# Patient Record
Sex: FEMALE | Race: WHITE | Employment: UNEMPLOYED | URBAN - METROPOLITAN AREA
[De-identification: names, ages, dates, MRNs, and addresses within clinical notes are randomized per-mention and may not be internally consistent; named-entity substitution may affect disease eponyms.]

---

## 2017-05-24 PROCEDURE — 88305 TISSUE EXAM BY PATHOLOGIST: CPT | Performed by: RADIOLOGY

## 2017-10-18 PROCEDURE — 82746 ASSAY OF FOLIC ACID SERUM: CPT | Performed by: INTERNAL MEDICINE

## 2017-10-18 PROCEDURE — 86803 HEPATITIS C AB TEST: CPT | Performed by: INTERNAL MEDICINE

## 2017-10-18 PROCEDURE — 82607 VITAMIN B-12: CPT | Performed by: INTERNAL MEDICINE

## 2018-05-04 PROBLEM — E03.8 HYPOTHYROIDISM DUE TO HASHIMOTO'S THYROIDITIS: Status: ACTIVE | Noted: 2018-05-04

## 2018-05-04 PROBLEM — E06.3 HYPOTHYROIDISM DUE TO HASHIMOTO'S THYROIDITIS: Status: ACTIVE | Noted: 2018-05-04

## 2018-05-04 PROBLEM — Z85.828 HISTORY OF SKIN CANCER: Status: ACTIVE | Noted: 2018-05-04

## 2018-10-24 PROBLEM — E03.4 HYPOTHYROIDISM DUE TO ACQUIRED ATROPHY OF THYROID: Status: ACTIVE | Noted: 2018-10-24

## 2018-10-24 PROBLEM — E03.8 HYPOTHYROIDISM DUE TO HASHIMOTO'S THYROIDITIS: Status: RESOLVED | Noted: 2018-05-04 | Resolved: 2018-10-24

## 2018-10-24 PROBLEM — E06.3 HYPOTHYROIDISM DUE TO HASHIMOTO'S THYROIDITIS: Status: RESOLVED | Noted: 2018-05-04 | Resolved: 2018-10-24

## 2020-10-27 PROBLEM — E53.8 VITAMIN B12 DEFICIENCY: Status: ACTIVE | Noted: 2020-10-27

## 2021-04-12 DIAGNOSIS — Z23 NEED FOR VACCINATION: ICD-10-CM

## 2021-06-02 PROCEDURE — 88305 TISSUE EXAM BY PATHOLOGIST: CPT | Performed by: RADIOLOGY

## 2021-06-02 PROCEDURE — 88344 IMHCHEM/IMCYTCHM EA MLT ANTB: CPT | Performed by: RADIOLOGY

## 2021-10-28 PROBLEM — E06.3 HASHIMOTO'S THYROIDITIS: Status: ACTIVE | Noted: 2021-10-28

## 2021-10-28 PROBLEM — R92.8 ABNORMAL MAMMOGRAM OF RIGHT BREAST: Status: ACTIVE | Noted: 2021-06-01

## 2023-04-18 ENCOUNTER — ORDER TRANSCRIPTION (OUTPATIENT)
Dept: ADMINISTRATIVE | Facility: HOSPITAL | Age: 68
End: 2023-04-18

## 2023-04-18 DIAGNOSIS — Z13.6 SCREENING FOR CARDIOVASCULAR CONDITION: Primary | ICD-10-CM

## 2023-07-09 ENCOUNTER — HOSPITAL ENCOUNTER (OUTPATIENT)
Dept: CT IMAGING | Age: 68
Discharge: HOME | End: 2023-07-09
Attending: FAMILY MEDICINE

## 2023-07-09 DIAGNOSIS — Z13.6 SCREENING FOR CARDIOVASCULAR CONDITION: ICD-10-CM

## 2023-07-26 ENCOUNTER — OFFICE VISIT (OUTPATIENT)
Dept: RHEUMATOLOGY | Facility: CLINIC | Age: 68
End: 2023-07-26
Payer: MEDICARE

## 2023-07-26 VITALS
TEMPERATURE: 98 F | WEIGHT: 127 LBS | BODY MASS INDEX: 19.25 KG/M2 | SYSTOLIC BLOOD PRESSURE: 110 MMHG | DIASTOLIC BLOOD PRESSURE: 70 MMHG | OXYGEN SATURATION: 98 % | HEART RATE: 75 BPM | HEIGHT: 68 IN | RESPIRATION RATE: 16 BRPM

## 2023-07-26 DIAGNOSIS — G89.29 CHRONIC BILATERAL LOW BACK PAIN WITHOUT SCIATICA: ICD-10-CM

## 2023-07-26 DIAGNOSIS — G62.9 NEUROPATHY: ICD-10-CM

## 2023-07-26 DIAGNOSIS — M81.0 AGE-RELATED OSTEOPOROSIS WITHOUT CURRENT PATHOLOGICAL FRACTURE: Primary | ICD-10-CM

## 2023-07-26 DIAGNOSIS — R61 NIGHT SWEATS: ICD-10-CM

## 2023-07-26 DIAGNOSIS — E53.8 VITAMIN B12 DEFICIENCY: ICD-10-CM

## 2023-07-26 DIAGNOSIS — M54.50 CHRONIC BILATERAL LOW BACK PAIN WITHOUT SCIATICA: ICD-10-CM

## 2023-07-26 PROCEDURE — 99204 OFFICE O/P NEW MOD 45 MIN: CPT | Performed by: INTERNAL MEDICINE

## 2023-07-26 RX ORDER — ASCORBATE CALCIUM/BIOFLAVONOID 1000-200MG
TABLET, EXTENDED RELEASE ORAL
COMMUNITY
Start: 2022-07-13

## 2023-07-31 ENCOUNTER — LAB ENCOUNTER (OUTPATIENT)
Dept: LAB | Age: 68
End: 2023-07-31
Attending: INTERNAL MEDICINE
Payer: MEDICARE

## 2023-07-31 ENCOUNTER — HOSPITAL ENCOUNTER (OUTPATIENT)
Dept: GENERAL RADIOLOGY | Age: 68
Discharge: HOME | End: 2023-07-31
Attending: INTERNAL MEDICINE
Payer: MEDICARE

## 2023-07-31 DIAGNOSIS — G62.9 NEUROPATHY: ICD-10-CM

## 2023-07-31 DIAGNOSIS — G89.29 CHRONIC BILATERAL LOW BACK PAIN WITHOUT SCIATICA: ICD-10-CM

## 2023-07-31 DIAGNOSIS — M54.50 CHRONIC BILATERAL LOW BACK PAIN WITHOUT SCIATICA: ICD-10-CM

## 2023-07-31 DIAGNOSIS — I34.1 MITRAL VALVE PROLAPSE: ICD-10-CM

## 2023-07-31 DIAGNOSIS — E53.8 VITAMIN B12 DEFICIENCY: ICD-10-CM

## 2023-07-31 DIAGNOSIS — M81.0 AGE-RELATED OSTEOPOROSIS WITHOUT CURRENT PATHOLOGICAL FRACTURE: ICD-10-CM

## 2023-07-31 DIAGNOSIS — R94.31 ECG ABNORMAL: Primary | ICD-10-CM

## 2023-07-31 DIAGNOSIS — R61 NIGHT SWEATS: ICD-10-CM

## 2023-07-31 DIAGNOSIS — R06.09 DOE (DYSPNEA ON EXERTION): ICD-10-CM

## 2023-07-31 DIAGNOSIS — R60.0 LOCALIZED EDEMA: ICD-10-CM

## 2023-07-31 DIAGNOSIS — I87.2 VENOUS INSUFFICIENCY OF LOWER EXTREMITY: ICD-10-CM

## 2023-07-31 DIAGNOSIS — E06.3 HASHIMOTO'S DISEASE: ICD-10-CM

## 2023-07-31 LAB
ALBUMIN SERPL-MCNC: 3.9 G/DL (ref 3.4–5)
ALBUMIN/GLOB SERPL: 1.6 {RATIO} (ref 1–2)
ALP LIVER SERPL-CCNC: 65 U/L
ALT SERPL-CCNC: 25 U/L
ANION GAP SERPL CALC-SCNC: 2 MMOL/L (ref 0–18)
AST SERPL-CCNC: 30 U/L (ref 15–37)
BASOPHILS # BLD AUTO: 0.04 X10(3) UL (ref 0–0.2)
BASOPHILS NFR BLD AUTO: 1 %
BILIRUB SERPL-MCNC: 0.6 MG/DL (ref 0.1–2)
BUN BLD-MCNC: 9 MG/DL (ref 7–18)
CALCIUM BLD-MCNC: 9.1 MG/DL (ref 8.5–10.1)
CHLORIDE SERPL-SCNC: 103 MMOL/L (ref 98–112)
CHOLEST SERPL-MCNC: 172 MG/DL (ref ?–200)
CO2 SERPL-SCNC: 29 MMOL/L (ref 21–32)
CREAT BLD-MCNC: 0.78 MG/DL
CRP SERPL HS-MCNC: <0.16 MG/L (ref ?–3)
EGFRCR SERPLBLD CKD-EPI 2021: 83 ML/MIN/1.73M2 (ref 60–?)
EOSINOPHIL # BLD AUTO: 0.12 X10(3) UL (ref 0–0.7)
EOSINOPHIL NFR BLD AUTO: 2.9 %
ERYTHROCYTE [DISTWIDTH] IN BLOOD BY AUTOMATED COUNT: 11.8 %
FASTING PATIENT LIPID ANSWER: YES
FASTING STATUS PATIENT QL REPORTED: YES
GLOBULIN PLAS-MCNC: 2.5 G/DL (ref 2.8–4.4)
GLUCOSE BLD-MCNC: 97 MG/DL (ref 70–99)
HCT VFR BLD AUTO: 32.6 %
HDLC SERPL-MCNC: 97 MG/DL (ref 40–59)
HGB BLD-MCNC: 11.9 G/DL
IMM GRANULOCYTES # BLD AUTO: 0.01 X10(3) UL (ref 0–1)
IMM GRANULOCYTES NFR BLD: 0.2 %
LDLC SERPL CALC-MCNC: 66 MG/DL (ref ?–100)
LYMPHOCYTES # BLD AUTO: 1.07 X10(3) UL (ref 1–4)
LYMPHOCYTES NFR BLD AUTO: 25.6 %
MAGNESIUM SERPL-MCNC: 2.1 MG/DL (ref 1.6–2.6)
MCH RBC QN AUTO: 32.1 PG (ref 26–34)
MCHC RBC AUTO-ENTMCNC: 36.5 G/DL (ref 31–37)
MCV RBC AUTO: 87.9 FL
MONOCYTES # BLD AUTO: 0.41 X10(3) UL (ref 0.1–1)
MONOCYTES NFR BLD AUTO: 9.8 %
NEUTROPHILS # BLD AUTO: 2.53 X10 (3) UL (ref 1.5–7.7)
NEUTROPHILS # BLD AUTO: 2.53 X10(3) UL (ref 1.5–7.7)
NEUTROPHILS NFR BLD AUTO: 60.5 %
NONHDLC SERPL-MCNC: 75 MG/DL (ref ?–130)
OSMOLALITY SERPL CALC.SUM OF ELEC: 277 MOSM/KG (ref 275–295)
PHOSPHATE SERPL-MCNC: 3.6 MG/DL (ref 2.5–4.9)
PLATELET # BLD AUTO: 185 10(3)UL (ref 150–450)
POTASSIUM SERPL-SCNC: 3.8 MMOL/L (ref 3.5–5.1)
PROT SERPL-MCNC: 6.4 G/DL (ref 6.4–8.2)
PTH-INTACT SERPL-MCNC: 31.5 PG/ML (ref 18.5–88)
RBC # BLD AUTO: 3.71 X10(6)UL
SODIUM SERPL-SCNC: 134 MMOL/L (ref 136–145)
TRIGL SERPL-MCNC: 41 MG/DL (ref 30–149)
TSI SER-ACNC: 0.43 MIU/ML (ref 0.36–3.74)
VIT B12 SERPL-MCNC: 782 PG/ML (ref 193–986)
VIT D+METAB SERPL-MCNC: 50.4 NG/ML (ref 30–100)
VLDLC SERPL CALC-MCNC: 6 MG/DL (ref 0–30)
WBC # BLD AUTO: 4.2 X10(3) UL (ref 4–11)

## 2023-07-31 PROCEDURE — 86141 C-REACTIVE PROTEIN HS: CPT

## 2023-07-31 PROCEDURE — 72202 X-RAY EXAM SI JOINTS 3/> VWS: CPT | Performed by: INTERNAL MEDICINE

## 2023-07-31 PROCEDURE — 82607 VITAMIN B-12: CPT

## 2023-07-31 PROCEDURE — 83521 IG LIGHT CHAINS FREE EACH: CPT

## 2023-07-31 PROCEDURE — 86334 IMMUNOFIX E-PHORESIS SERUM: CPT

## 2023-07-31 PROCEDURE — 83735 ASSAY OF MAGNESIUM: CPT

## 2023-07-31 PROCEDURE — 85025 COMPLETE CBC W/AUTO DIFF WBC: CPT

## 2023-07-31 PROCEDURE — 36415 COLL VENOUS BLD VENIPUNCTURE: CPT

## 2023-07-31 PROCEDURE — 80061 LIPID PANEL: CPT

## 2023-07-31 PROCEDURE — 84165 PROTEIN E-PHORESIS SERUM: CPT

## 2023-07-31 PROCEDURE — 80053 COMPREHEN METABOLIC PANEL: CPT

## 2023-07-31 PROCEDURE — 82306 VITAMIN D 25 HYDROXY: CPT

## 2023-07-31 PROCEDURE — 84443 ASSAY THYROID STIM HORMONE: CPT

## 2023-07-31 PROCEDURE — 72110 X-RAY EXAM L-2 SPINE 4/>VWS: CPT | Performed by: INTERNAL MEDICINE

## 2023-07-31 PROCEDURE — 83970 ASSAY OF PARATHORMONE: CPT

## 2023-07-31 PROCEDURE — 84100 ASSAY OF PHOSPHORUS: CPT

## 2023-08-02 ENCOUNTER — PATIENT MESSAGE (OUTPATIENT)
Dept: RHEUMATOLOGY | Facility: CLINIC | Age: 68
End: 2023-08-02

## 2023-08-02 LAB
ALBUMIN SERPL ELPH-MCNC: 4.36 G/DL (ref 3.75–5.21)
ALBUMIN/GLOB SERPL: 2.37 {RATIO} (ref 1–2)
ALPHA1 GLOB SERPL ELPH-MCNC: 0.24 G/DL (ref 0.19–0.46)
ALPHA2 GLOB SERPL ELPH-MCNC: 0.53 G/DL (ref 0.48–1.05)
B-GLOBULIN SERPL ELPH-MCNC: 0.56 G/DL (ref 0.68–1.23)
GAMMA GLOB SERPL ELPH-MCNC: 0.51 G/DL (ref 0.62–1.7)
KAPPA LC FREE SER-MCNC: 0.79 MG/DL (ref 0.33–1.94)
KAPPA LC FREE/LAMBDA FREE SER NEPH: 1.02 {RATIO} (ref 0.26–1.65)
LAMBDA LC FREE SERPL-MCNC: 0.78 MG/DL (ref 0.57–2.63)
PROT SERPL-MCNC: 6.2 G/DL (ref 6.4–8.2)

## 2023-08-04 NOTE — TELEPHONE ENCOUNTER
Called patient. Discussed test results in detail. Labs grossly negative for secondary causes of osteoporosis. I still recommend moving forward with Prolia. She is interested in moving forward but would like to hold off for now due to wanting to get worked up for other things. Discussed that her SPEP showed hypogammaglobulinemia. Recommended that she consider immunology versus hematology evaluation. She would like to get this sorted before starting/committing to Prolia. Patient understands her risk of fracture in the meantime. Will keep us updated when she would like us to move forward with getting Prolia approved. Send [employer] message with names of a few providers for her to consider    Patient verbalized understanding of above instructions. No further questions at this time.     Shiela Ochoa, DO  [facility]  8/4/2023 07/2023  CBC with WBC 4.2; hemoglobin 11.9; platelet 972  CMP grossly normal  SPEP with hypogammaglobulinemia; no monoclonal protein detected by immunofixation  Magnesium normal  PTH normal  B12 782 normal  TSH normal  High-sensitivity CRP normal  Vitamin D 50.4

## 2023-08-31 ENCOUNTER — OFFICE VISIT (OUTPATIENT)
Dept: HEMATOLOGY/ONCOLOGY | Facility: HOSPITAL | Age: 68
End: 2023-08-31
Attending: INTERNAL MEDICINE
Payer: MEDICARE

## 2023-08-31 VITALS
OXYGEN SATURATION: 100 % | TEMPERATURE: 98 F | SYSTOLIC BLOOD PRESSURE: 158 MMHG | BODY MASS INDEX: 20 KG/M2 | RESPIRATION RATE: 18 BRPM | HEART RATE: 68 BPM | WEIGHT: 129.38 LBS | DIASTOLIC BLOOD PRESSURE: 73 MMHG

## 2023-08-31 DIAGNOSIS — D80.1 HYPOGAMMAGLOBULINEMIA (HCC): ICD-10-CM

## 2023-08-31 DIAGNOSIS — D64.9 ANEMIA, UNSPECIFIED TYPE: Primary | ICD-10-CM

## 2023-08-31 DIAGNOSIS — M81.0 AGE-RELATED OSTEOPOROSIS WITHOUT CURRENT PATHOLOGICAL FRACTURE: ICD-10-CM

## 2023-08-31 DIAGNOSIS — D50.8 IRON DEFICIENCY ANEMIA SECONDARY TO INADEQUATE DIETARY IRON INTAKE: ICD-10-CM

## 2023-08-31 LAB
DEPRECATED HBV CORE AB SER IA-ACNC: 25.6 NG/ML
FOLATE SERPL-MCNC: 21.1 NG/ML (ref 8.7–?)
IGA SERPL-MCNC: 69.3 MG/DL (ref 70–312)
IGM SERPL-MCNC: 78.9 MG/DL (ref 43–279)
IMMUNOGLOBULIN PNL SER-MCNC: 521 MG/DL (ref 791–1643)
IRON SATN MFR SERPL: 27 %
IRON SERPL-MCNC: 109 UG/DL
TIBC SERPL-MCNC: 398 UG/DL (ref 240–450)
TRANSFERRIN SERPL-MCNC: 267 MG/DL (ref 200–360)

## 2023-08-31 PROCEDURE — 99205 OFFICE O/P NEW HI 60 MIN: CPT | Performed by: INTERNAL MEDICINE

## 2023-09-01 ENCOUNTER — TELEPHONE (OUTPATIENT)
Dept: HEMATOLOGY/ONCOLOGY | Facility: HOSPITAL | Age: 68
End: 2023-09-01

## 2023-09-15 ENCOUNTER — OFFICE VISIT (OUTPATIENT)
Dept: HEMATOLOGY/ONCOLOGY | Facility: HOSPITAL | Age: 68
End: 2023-09-15
Attending: INTERNAL MEDICINE
Payer: MEDICARE

## 2023-09-15 VITALS
SYSTOLIC BLOOD PRESSURE: 122 MMHG | HEART RATE: 57 BPM | DIASTOLIC BLOOD PRESSURE: 68 MMHG | OXYGEN SATURATION: 98 % | TEMPERATURE: 98 F | RESPIRATION RATE: 16 BRPM

## 2023-09-15 DIAGNOSIS — D50.8 IRON DEFICIENCY ANEMIA SECONDARY TO INADEQUATE DIETARY IRON INTAKE: Primary | ICD-10-CM

## 2023-09-15 PROCEDURE — 96374 THER/PROPH/DIAG INJ IV PUSH: CPT

## 2023-11-07 ENCOUNTER — OFFICE VISIT (OUTPATIENT)
Dept: HEMATOLOGY/ONCOLOGY | Facility: HOSPITAL | Age: 68
End: 2023-11-07
Attending: INTERNAL MEDICINE
Payer: MEDICARE

## 2023-11-07 VITALS
OXYGEN SATURATION: 99 % | BODY MASS INDEX: 19.55 KG/M2 | HEIGHT: 67.99 IN | WEIGHT: 129 LBS | DIASTOLIC BLOOD PRESSURE: 71 MMHG | RESPIRATION RATE: 16 BRPM | TEMPERATURE: 98 F | HEART RATE: 58 BPM | SYSTOLIC BLOOD PRESSURE: 121 MMHG

## 2023-11-07 DIAGNOSIS — E03.4 HYPOTHYROIDISM DUE TO ACQUIRED ATROPHY OF THYROID: ICD-10-CM

## 2023-11-07 DIAGNOSIS — D80.1 HYPOGAMMAGLOBULINEMIA (HCC): Primary | ICD-10-CM

## 2023-11-07 DIAGNOSIS — G62.9 NEUROPATHY: ICD-10-CM

## 2023-11-07 DIAGNOSIS — D50.8 IRON DEFICIENCY ANEMIA SECONDARY TO INADEQUATE DIETARY IRON INTAKE: ICD-10-CM

## 2023-11-07 LAB
IGA SERPL-MCNC: 70.7 MG/DL (ref 70–312)
IGM SERPL-MCNC: 71.2 MG/DL (ref 43–279)
IMMUNOGLOBULIN PNL SER-MCNC: 528 MG/DL (ref 791–1643)

## 2023-11-07 PROCEDURE — 99214 OFFICE O/P EST MOD 30 MIN: CPT | Performed by: INTERNAL MEDICINE

## 2023-11-07 NOTE — PROGRESS NOTES
Here for follow up. Wants to discuss lab results, iron infusion, and neuropathy. She did not feel relief from her iron infusion in September. She still feels fatigued and cold all the time. Has neuropathy in her feet and the worse is her two toes on her R foot.

## 2025-02-03 ENCOUNTER — OFFICE VISIT (OUTPATIENT)
Dept: NEUROLOGY | Facility: CLINIC | Age: 70
End: 2025-02-03
Payer: MEDICARE

## 2025-02-03 VITALS
SYSTOLIC BLOOD PRESSURE: 136 MMHG | DIASTOLIC BLOOD PRESSURE: 70 MMHG | WEIGHT: 129 LBS | HEART RATE: 64 BPM | BODY MASS INDEX: 20 KG/M2 | RESPIRATION RATE: 16 BRPM

## 2025-02-03 DIAGNOSIS — R20.2 NUMBNESS AND TINGLING OF BOTH FEET: ICD-10-CM

## 2025-02-03 DIAGNOSIS — G62.9 PERIPHERAL POLYNEUROPATHY: Primary | ICD-10-CM

## 2025-02-03 DIAGNOSIS — E06.3 HASHIMOTO'S THYROIDITIS: ICD-10-CM

## 2025-02-03 DIAGNOSIS — D80.1 HYPOGAMMAGLOBULINEMIA (HCC): ICD-10-CM

## 2025-02-03 DIAGNOSIS — D50.9 IRON DEFICIENCY ANEMIA, UNSPECIFIED IRON DEFICIENCY ANEMIA TYPE: ICD-10-CM

## 2025-02-03 DIAGNOSIS — R20.0 NUMBNESS AND TINGLING OF BOTH FEET: ICD-10-CM

## 2025-02-03 PROCEDURE — 99204 OFFICE O/P NEW MOD 45 MIN: CPT | Performed by: OTHER

## 2025-02-03 NOTE — PATIENT INSTRUCTIONS
Refill policies:    Allow 2-3 business days for refills; controlled substances may take longer.  Contact your pharmacy at least 5 days prior to running out of medication and have them send an electronic request or submit request through the “request refill” option in your [employer] account.  Refills are not addressed on weekends; covering physicians do not authorize routine medications on weekends.  No narcotics or controlled substances are refilled after noon on Fridays or by on call physicians.  By law, narcotics must be electronically prescribed.  A 30 day supply with no refills is the maximum allowed.  If your prescription is due for a refill, you may be due for a follow up appointment.  To best provide you care, patients receiving routine medications need to be seen at least once a year.  Patients receiving narcotic/controlled substance medications need to be seen at least once every 3 months.  In the event that your preferred pharmacy does not have the requested medication in stock (e.g. Backordered), it is your responsibility to find another pharmacy that has the requested medication available.  We will gladly send a new prescription to that pharmacy at your request.    Scheduling Tests:    If your physician has ordered radiology tests such as MRI or CT scans, please contact Central Scheduling at [phone] right away to schedule the test.  Once scheduled, the [facility] Centralized Referral Team will work with your insurance carrier to obtain pre-certification or prior authorization.  Depending on your insurance carrier, approval may take 3-10 days.  It is highly recommended patients assure they have received an authorization before having a test performed.  If test is done without insurance authorization, patient may be responsible for the entire amount billed.      Precertification and Prior Authorizations:  If your physician has recommended that you have a procedure or additional testing performed the [facility]  Centralized Referral Team will contact your insurance carrier to obtain pre-certification or prior authorization.    You are strongly encouraged to contact your insurance carrier to verify that your procedure/test has been approved and is a COVERED benefit.  Although the [facility] Centralized Referral Team does its due diligence, the insurance carrier gives the disclaimer that \"Although the procedure is authorized, this does not guarantee payment.\"    Ultimately the patient is responsible for payment.   Thank you for your understanding in this matter.  Paperwork Completion:  If you require FMLA or disability paperwork for your recovery, please make sure to either drop it off or have it faxed to our office at [phone]. Be sure the form has your name and date of birth on it.  The form will be faxed to our Forms Department and they will complete it for you.  There is a 25$ fee for all forms that need to be filled out.  Please be aware there is a 10-14 day turnaround time.  You will need to sign a release of information (CALLIE) form if your paperwork does not come with one.  You may call the Forms Department with any questions at [phone].  Their fax number is [phone].

## 2025-02-03 NOTE — PROGRESS NOTES
[facility]   Neurology; INITIAL CLINIC VISIT  2/3/2025, 10:17 AM     Krystal Weber Patient Status:  No patient class for patient encounter    1955 MRN [id number]   Location [facility] Attending No att. providers found     PCP PATO DENNY     This is a consultation requested by (x) PCP   () other Specialist   () Physician Extender    REASON FOR THE VISIT:  NEuropathy feet    HISTORY OF PRESENT ILLNESS:  Krystal Weber is a(n) 69 year old female.   About 15 years ago she started noticing that when walking barefoot in wood floor feet would feel prickly    Then started using shoes and about 10 years ago foot doctor mentioned she was getting neuropathy. She remained active    She has Hashimoto's autoimmune in her 20's   After that, she always feels cold.  In the beginning had trouble weight gain then last few years stayed well on her weight control    The feet kept getting worse and now would wake up with numb feeling in feet and has burning and tingling that comes and goes    No history of CHEMO   No ETOH  Last year diagnosed with Blood disorder:  hypogammaglobulinemia, iron deficiency anemia         PAST MEDICAL HISTORY:  Past Medical History:    Allergic rhinitis    BCC (basal cell carcinoma of skin)    Cancer (HCC)    Hypothyroidism    Hypothyroidism due to Hashimoto's thyroiditis    Mitral valve prolapse    Neuropathy    right foot numbness & tingling/ did not see neurologist     Osteopenia    SCCA (squamous cell carcinoma) of skin       PAST SURGICAL HISTORY:  Past Surgical History:   Procedure Laterality Date    Benign biopsy left  2017    Benign biopsy right  ,     exc bx each time    Colonoscopy  1994    done for fam hx and rectal bleeding, was normal     Colonoscopy      elsewhere, normal screening    Colonoscopy N/A 2017    Procedure: COLONOSCOPY, POSSIBLE BIOPSY, POSSIBLE POLYPECTOMY 95941;  Surgeon:  Renzo Vega MD;  Location: [facility]    Colonoscopy,diagnostic  1/23/17    normal    Hysterectomy  1994    KAUSHIK, ovaries preserved (done for fibroids)    Injection, anesthetic/steroid, transforaminal epidural; lumbar/sacral, add'l level Right 9/28/2015    Procedure: LUMBAR / TRANSFORAMINAL EPIDURAL STEROID INJECTION;  Surgeon: Leighton Olsen MD;  Location: [facility]    Injection, anesthetic/steroid, transforaminal epidural; lumbar/sacral, add'l level Right 10/12/2015    Procedure: LUMBAR / TRANSFORAMINAL EPIDURAL STEROID INJECTION;  Surgeon: Leighton Olsen MD;  Location: [facility]    Injection, anesthetic/steroid, transforaminal epidural; lumbar/sacral, single level Right 9/28/2015    Procedure: LUMBAR / TRANSFORAMINAL EPIDURAL STEROID INJECTION;  Surgeon: Leighton Olsen MD;  Location: [facility]    Injection, anesthetic/steroid, transforaminal epidural; lumbar/sacral, single level Right 10/12/2015    Procedure: LUMBAR / TRANSFORAMINAL EPIDURAL STEROID INJECTION;  Surgeon: Leighton Olsen MD;  Location: [facility]    Other surgical history Right 1994    right breast lumpectomy, benign    Tonsillectomy  1976       FAMILY HISTORY:  family history includes Cancer in her father, maternal grandfather, maternal grandmother, mother, and paternal grandfather; Heart Disorder in her father; Hypertension in her mother; Lipids in her father; Other in her father, maternal grandmother, mother, paternal grandmother, son, and son.    SOCIAL HISTORY:   reports that she has never smoked. She has never used smokeless tobacco. She reports current alcohol use. She reports that she does not use drugs.    ALLERGIES:  Allergies[1]    MEDICATIONS:    Current Outpatient Medications:     Multiple Minerals (CALCIUM-MAGNESIUM-ZINC) Oral Tab, Take by mouth., Disp: , Rfl:     levothyroxine 125 MCG Oral Tab, Take one tablet by mouth one  time daily, Disp: 90 tablet, Rfl: 3     levothyroxine (LEVOXYL) 112 MCG Oral Tab, Take one tablet by mouth one time daily, Disp: 90 tablet, Rfl: 3    ZINC OR, Take 1 tablet by mouth daily., Disp: , Rfl:     ELDERBERRY OR, Take by mouth daily., Disp: , Rfl:     Calcium-Magnesium 500-250 MG Oral Tab, One daily with zinc, Disp: 60 tablet, Rfl: 0    VITAMIN D 1000 UNIT OR CAPS, 1 CAPSULE DAILY, Disp: , Rfl:     MULTI-VITAMIN/MINERALS OR TABS, 1 TABLET DAILY, Disp: , Rfl:     FISH OIL, daily, Disp: , Rfl:     FLAX SEED OIL OR, daily, Disp: , Rfl:     SELENIUM OR, daily, Disp: , Rfl:     COQ10 100 MG OR CAPS, daily, Disp: , Rfl:       REVIEW OF SYSTEMS:  A comprehensive 10 point review of systems was completed.  Pertinent positives and negatives noted in the the HPI.    Had SCIATICA years ago and never had it again      PHYSICAL EXAMINATION:  /70   Pulse 64   Resp 16   Wt 129 lb (58.5 kg)   BMI 19.62 kg/m²   Looks stated age, PC AS  No LAD, no TM  Lungs CTA  Heart S1S2  Bensley nailbeds no Cyanosis    NEURO:  Alert oriented fluent   CN grossly normal  Some degree of atrophy distal muscles BUT it could just be part of her general body habitus  DTRs present down to the ankles  Reduced TEMP ankles down  Reduced Pin ankles down  Vibration intact  HTS normal  Gait normal  Pulses good  Balance is good        Special Test:       DIAGNOSTIC DATA:     IMAGING:  none    PROBLEM/S IDENTIFIED THIS VISIT:  1. Peripheral polyneuropathy    2. Numbness and tingling of both feet    3. Hashimoto's thyroiditis    4. Iron deficiency anemia, unspecified iron deficiency anemia type    5. Hypogammaglobulinemia (HCC)        Discussion: The long indolent course speaks for possible slow progression and should allay fears of being crippled in the future.  However we do need to look further into the type of neuropathy whether it is demyelinating or axonal.  She has a host of other autoimmune disorder which raises the concern about inflammatory polyneuropathy.  This would be  especially so if the type of neuropathy we are dealing with is demyelinating in nature.    I would start off with EMG nerve conduction study of lower extremity and from there, proceed with further testings depending on the type.          Follow up:  Return for scheduled EMG-NCV test.      Howard Jackson MD  Vascular & General Neurology  Director, Multiple Sclerosis Program  [facility]  2/3/2025, Time completed 10:17 AM    After visit Summary handed to patient by RN or MA and was escorted out and handed-off discharge process and instructions to the check out desk.  No additional issues raised to staff at this time interval    This document is to be interpreted as my current opinion regarding the case as of the stated date of service based on the information available to me at this time and supersedes any prior opinion expressed either orally or in writing.  Services rendered are only within the scope of direct medical care.  Sometimes, reports may have been prepared partially using a speech recognition software technology.  If a word or phrase is confusing or out of context, please do not hesitate to call for clarification.              [1]   Allergies  Allergen Reactions    Pcns [Penicillins] HIVES

## 2025-02-25 ENCOUNTER — LAB ENCOUNTER (OUTPATIENT)
Dept: LAB | Age: 70
End: 2025-02-25
Attending: INTERNAL MEDICINE
Payer: MEDICARE

## 2025-02-25 ENCOUNTER — OFFICE VISIT (OUTPATIENT)
Dept: RHEUMATOLOGY | Facility: CLINIC | Age: 70
End: 2025-02-25
Payer: MEDICARE

## 2025-02-25 VITALS
DIASTOLIC BLOOD PRESSURE: 72 MMHG | TEMPERATURE: 97 F | BODY MASS INDEX: 19.56 KG/M2 | SYSTOLIC BLOOD PRESSURE: 136 MMHG | OXYGEN SATURATION: 99 % | WEIGHT: 124.63 LBS | RESPIRATION RATE: 16 BRPM | HEART RATE: 66 BPM | HEIGHT: 67 IN

## 2025-02-25 DIAGNOSIS — R53.82 CHRONIC FATIGUE: ICD-10-CM

## 2025-02-25 DIAGNOSIS — E55.9 VITAMIN D DEFICIENCY: ICD-10-CM

## 2025-02-25 DIAGNOSIS — M11.20 CALCIUM PYROPHOSPHATE DEPOSITION DISEASE (CPPD): ICD-10-CM

## 2025-02-25 DIAGNOSIS — R76.8 LOW SERUM IGG FOR AGE: ICD-10-CM

## 2025-02-25 DIAGNOSIS — M25.50 POLYARTHRALGIA: ICD-10-CM

## 2025-02-25 DIAGNOSIS — M81.0 AGE-RELATED OSTEOPOROSIS WITHOUT CURRENT PATHOLOGICAL FRACTURE: ICD-10-CM

## 2025-02-25 DIAGNOSIS — M81.0 AGE-RELATED OSTEOPOROSIS WITHOUT CURRENT PATHOLOGICAL FRACTURE: Primary | ICD-10-CM

## 2025-02-25 LAB
CK SERPL-CCNC: 175 U/L
CRP SERPL-MCNC: <0.4 MG/DL (ref ?–0.5)
ERYTHROCYTE [SEDIMENTATION RATE] IN BLOOD: 1 MM/HR
MAGNESIUM SERPL-MCNC: 2.1 MG/DL (ref 1.6–2.6)
PHOSPHATE SERPL-MCNC: 3.9 MG/DL (ref 2.4–5.1)
RHEUMATOID FACT SERPL-ACNC: <3.5 IU/ML (ref ?–14)
VIT D+METAB SERPL-MCNC: 58 NG/ML (ref 30–100)

## 2025-02-25 PROCEDURE — 86140 C-REACTIVE PROTEIN: CPT

## 2025-02-25 PROCEDURE — 99214 OFFICE O/P EST MOD 30 MIN: CPT | Performed by: INTERNAL MEDICINE

## 2025-02-25 PROCEDURE — 86431 RHEUMATOID FACTOR QUANT: CPT

## 2025-02-25 PROCEDURE — 84100 ASSAY OF PHOSPHORUS: CPT

## 2025-02-25 PROCEDURE — 82550 ASSAY OF CK (CPK): CPT

## 2025-02-25 PROCEDURE — 85652 RBC SED RATE AUTOMATED: CPT

## 2025-02-25 PROCEDURE — 86200 CCP ANTIBODY: CPT

## 2025-02-25 PROCEDURE — 86038 ANTINUCLEAR ANTIBODIES: CPT

## 2025-02-25 PROCEDURE — 82306 VITAMIN D 25 HYDROXY: CPT

## 2025-02-25 PROCEDURE — 36415 COLL VENOUS BLD VENIPUNCTURE: CPT

## 2025-02-25 PROCEDURE — 83735 ASSAY OF MAGNESIUM: CPT

## 2025-02-25 NOTE — PROGRESS NOTES
?  RHEUMATOLOGY FOLLOW UP   Date of visit: 02/25/2025  ?  Chief Complaint   Patient presents with    Follow - Up     LOV 7/26/2023  Rapid 3 score is 1.7  No falls or fractures since last visit. No medication for the osteoporosis.      ASSESSMENT, DISCUSSION & PLAN   Assessment:  1. Age-related osteoporosis without current pathological fracture    2. Vitamin D deficiency    3. Low serum IgG for age    4. Polyarthralgia    5. Chronic fatigue    6. Calcium pyrophosphate deposition disease (CPPD)        Discussion:  Ms. Krystal Reed is a 68 yo woman with a hx of thyroid disease and dx of osteoporosis in 2022 compared to osteopenia previously. She does take calcium and vitamin d daily as well as focuses on weight bearing activities daily. Previously discussed at length need for medication treatment and risk of fracture without treatment. At prior appt discussed at length options of Reclast vs Prolia and discussed both at length which she would like to avoid.   She has been following with neurology for the neuropathic pain she feels in her legs- and has plans to get EMG.   As part of her work up for me, I found a low IgG. She was seen by hematology without further concerns.   She has been having increased infections, recommended she be seen by immunology and encouraged her to discuss possible IVIG if necessary. I wonder if that may help with some of her fatigue, body aches, infection risk and neuropathic pain.     Since she does have for increased fatigue and some increased joint pain, will perform updated labs.  Depending on these test results, will determine follow-up.  Otherwise, okay to follow-up in summer 2026 after her next bone density.  In the meantime, recommended for her osteoporosis that she continue weightbearing activities.  She may want to consider adding a weighted vest or ankle weights to increase that weightbearing with her regular exercise.  Also encouraged her to look into allergy based calcium or  calcium/vitamin D with vitamin K to see if this is better absorbed.    Patient verbalized understanding of above instructions. No further questions at this time.      Code selection for this visit was based on time spent (35min) on date of service in preparing to see the patient, obtaining and/or reviewing separately obtained history, performing a medically appropriate examination, counseling and educating the patient/family/caregiver, ordering medications or testing, referring and communicating with other healthcare providers, documenting clinical information in the E HR, independently interpreting results and communicating results to the patient/family/caregiver and care coordination with the patient's other providers.        ?  Plan:  Diagnoses and all orders for this visit:    Age-related osteoporosis without current pathological fracture  -     Vitamin D; Future  -     Magnesium; Future  -     Phosphorus; Future    Vitamin D deficiency  -     Vitamin D; Future    Low serum IgG for age    Polyarthralgia  -     Rheumatoid Arthritis Factor; Future  -     Cyclic Citrullinate Pep. IGG; Future  -     Sed Rate, Westergren (Automated) [E]; Future  -     C-Reactive Protein [E]; Future  -     Anti-Nuclear Antibody (BOB) by IFA, Reflex Titer + Specific Antibodies; Future  -     CK (Creatine Kinase) (Not Creatinine); Future  -     Vitamin D; Future  -     Magnesium; Future  -     Phosphorus; Future    Chronic fatigue  -     Rheumatoid Arthritis Factor; Future  -     Cyclic Citrullinate Pep. IGG; Future  -     Sed Rate, Westergren (Automated) [E]; Future  -     C-Reactive Protein [E]; Future  -     Anti-Nuclear Antibody (BOB) by IFA, Reflex Titer + Specific Antibodies; Future  -     CK (Creatine Kinase) (Not Creatinine); Future  -     Vitamin D; Future  -     Magnesium; Future  -     Phosphorus; Future    Calcium pyrophosphate deposition disease (CPPD)  -     Rheumatoid Arthritis Factor; Future  -     Cyclic Citrullinate  Pep. IGG; Future  -     Sed Rate, Westergren (Automated) [E]; Future  -     C-Reactive Protein [E]; Future  -     Anti-Nuclear Antibody (BOB) by IFA, Reflex Titer + Specific Antibodies; Future  -     CK (Creatine Kinase) (Not Creatinine); Future  -     Vitamin D; Future  -     Magnesium; Future  -     Phosphorus; Future          Return in about 18 months (around 8/25/2026).  ?  HPI   Krystal Weber is a 69 year old female with the following active problems who is referred for rheumatologic evaluation due to another opinion for her osteoporosis. Presents for follow up.    Since her last visit, she has been doing well.  Has been busy seeing multiple specialists.  With hematology, given an iron infusion and since that time, been taking oral three times/week and doing well. Did get dx with hypogammaglobulinemia but just monitoring.   Had to get flu shot in October- a few weeks later, suffered flu infection and took awhile to resolve. Had chronic cough that only recently resolved  Seen by neurology for the tingling- felt in feet/toes and right hand. Has plans to get EMG.     Feeling a lack of energy and doesn't quite feel herself.  Her toes can turn bright red. Had leg swelling- seen by vein specialist before and thinks needs to be seen again.   Feels like her hands are swollen, hard to get rings on/off. Feels a deeper sort of swelling but not obvious to the looks.   States frustrated with her symptoms since her thyroid disease is under control.   Having worsened knee pain- thinks related to activity. Seen and had right knee xray due to back of knee pain. Told she has baker's cyst.     + morning stiffness, improves with activity, felt primarily in the hands and sometimes the shoulder. Lasts for about 5-10 minutes  Denies skin rashes but redness over the hands and toes. Denies itching. Does have hx of multiple basal cell carcinoma.   Denies hair loss/thinning.   Denies nasal ulcers  + chronic canker sores, worsened with  stress (last time around christmas)     In terms of osteoporosis-   No falls or fractures.   Is walking 3miles daily. Is also running on her treadmill 20 minutes.   Is taking calcium supplement 1200mg daily  Is taking vitamin d 2000IU daily   Denies regular ETOH use.     HPI from initial consultation      States over the past one year, she has not felt as well as she normally does.  Has had hashimoto's since her 20s and on thyroid medication.   There was a delay in her BMD due to pandemic. When she went, she was found to be in osteoporosis.   Was referred to spine specialist through [facility]. Recommended Reclast infusions. However, after doing her own research, she was hesitant.  Followed with her endocrinologist and recommend Prolia. Pt hesitant to try any medications.     Hx of cardiac arrest after birth of her son. Never able to figure out cause of event. Was following with cardiologist for several years but eventually discharged and told to follow prn. Now recommended by her endocrinologist to follow back with cards as well as due to varicose veins. Recently had ECHO, duplex, stress ECHO and heart scan- all were normal except for varicose veins. Told EKG was abnormal, has follow up with cardiology in a few weeks.   Hx of basal cell carcinoma - typically on hands and one on nose     Hx of mitral valve prolapse   Hx of neuropathy in her feet, unclear etiology. Dx about 10 years ago. Sensitivity to walking barefoot. Stable over the bottom of feet and in the toes   Can get a burning pain in the back/buttock/groin, feels similar to neuropathic pain in the feet. Feels similar to prior shingles that occurred over the scalp    Hx of right wrist fracture- s/p running with her dog and suffered a fall. Did not require surgical intervention.   Denies any other falls/fractures   Exercises daily- weight bearing daily- hand weights, exercise bands, walks 3.5miles daily   Taking calcium 1200mg daily (was 800mg previously)  Taking  vitamin d 1000IU   Mostly vegetarian except fish and eggs.   Thyroid function normal/stable   Menopause unclear, had partial hysterectomy. Never had hot flashes.   Menarche in 7th grade.   3 pregnancies, one miscarriage at 8w, able to conceive both children following that.   Denies smoking history  Rare ETOH use.     Stays up to date on pap/mammogram   Family hx of osteoporosis in her mother, no known fracture.     Ongoing stomach issues, sensitivity to oral meds. Denies heartburn but gets stomach cramps.     + cold easily  + increased night sweats   + dry eyes, mild, uses Refresh  + vertigo but rare   + tingling/burning sensation that can happen diffusely but not daily     Past Medical History:  Past Medical History:    Allergic rhinitis    BCC (basal cell carcinoma of skin)    Cancer (HCC)    Hypogammaglobulinemia (HCC)    Hypothyroidism    Hypothyroidism due to Hashimoto's thyroiditis    Mitral valve prolapse    Neuropathy    right foot numbness & tingling/ did not see neurologist     Osteopenia    SCCA (squamous cell carcinoma) of skin     Past Surgical History:  Past Surgical History:   Procedure Laterality Date    Benign biopsy left  05/2017    Benign biopsy right  1999, 1998    exc bx each time    Colonoscopy  1994    done for fam hx and rectal bleeding, was normal     Colonoscopy  2006    elsewhere, normal screening    Colonoscopy N/A 1/23/2017    Procedure: COLONOSCOPY, POSSIBLE BIOPSY, POSSIBLE POLYPECTOMY 75965;  Surgeon: Renzo Vega MD;  Location: [facility]    Colonoscopy,diagnostic  1/23/17    normal    Hysterectomy  1994    KAUSHIK, ovaries preserved (done for fibroids)    Injection, anesthetic/steroid, transforaminal epidural; lumbar/sacral, add'l level Right 9/28/2015    Procedure: LUMBAR / TRANSFORAMINAL EPIDURAL STEROID INJECTION;  Surgeon: Leighton Olsen MD;  Location: [facility]    Injection, anesthetic/steroid, transforaminal epidural; lumbar/sacral, add'l level  Right 10/12/2015    Procedure: LUMBAR / TRANSFORAMINAL EPIDURAL STEROID INJECTION;  Surgeon: Leighton Olsen MD;  Location: [facility]    Injection, anesthetic/steroid, transforaminal epidural; lumbar/sacral, single level Right 9/28/2015    Procedure: LUMBAR / TRANSFORAMINAL EPIDURAL STEROID INJECTION;  Surgeon: Leighton Olsen MD;  Location: [facility]    Injection, anesthetic/steroid, transforaminal epidural; lumbar/sacral, single level Right 10/12/2015    Procedure: LUMBAR / TRANSFORAMINAL EPIDURAL STEROID INJECTION;  Surgeon: Leighton Olsen MD;  Location: [facility]    Other surgical history Right 1994    right breast lumpectomy, benign    Tonsillectomy  1976     Family History:  Family History   Problem Relation Age of Onset    Cancer Father         prostate, 2 separate colon, bladder, lymphoma    Lipids Father     Heart Disorder Father     Other (Other) Father     Hypertension Mother     Cancer Mother         MDD, low platelets/ lymphoma     Other (Other) Mother     Other (Other) Son         KENNEDY, wheat intolerance    Other (Other) Son         KENNEDY Osgood-Schlatter's    Cancer Maternal Grandmother         liver CA    Other (Other) Maternal Grandmother     Cancer Maternal Grandfather         throat Ca, smoker    Other (Other) Paternal Grandmother         CVA    Cancer Paternal Grandfather         unknown kind     Social History:  Social History     Socioeconomic History    Marital status:    Tobacco Use    Smoking status: Never    Smokeless tobacco: Never   Vaping Use    Vaping status: Never Used   Substance and Sexual Activity    Alcohol use: Yes     Alcohol/week: 0.0 - 2.0 standard drinks of alcohol     Comment: 1-2 glasses/month    Drug use: No   Other Topics Concern    Caffeine Concern Yes     Comment: coffee    Seat Belt Yes   Social History Narrative        2 children            Colon - 07    dexa - 2/15 - osteopenia (CDH0     Social Drivers of Health     Food  Insecurity: High Risk (2/18/2025)    Received from [facility]    Food Insecurity     Have there been times that your food ran out, and you didn't have money to get more?: No     Are there times that you worry that this might happen?: Yes   Transportation Needs: Low Risk  (2/18/2025)    Received from [facility]    Transportation Needs     Do you have trouble getting transportation to medical appointments?: No     How do you normally get to and from your appointments?: Other     Medications:  Outpatient Medications Marked as Taking for the 2/25/25 encounter (Office Visit) with Zaida Peters, DO   Medication Sig Dispense Refill    Multiple Minerals (CALCIUM-MAGNESIUM-ZINC) Oral Tab Take by mouth.      levothyroxine 125 MCG Oral Tab Take one tablet by mouth one  time daily 90 tablet 3    levothyroxine (LEVOXYL) 112 MCG Oral Tab Take one tablet by mouth one time daily 90 tablet 3    ZINC OR Take 1 tablet by mouth daily.      ELDERBERRY OR Take by mouth daily.      Calcium-Magnesium 500-250 MG Oral Tab One daily with zinc 60 tablet 0    VITAMIN D 1000 UNIT OR CAPS 1 CAPSULE DAILY      MULTI-VITAMIN/MINERALS OR TABS 1 TABLET DAILY      FISH OIL daily      FLAX SEED OIL OR daily      SELENIUM OR daily      COQ10 100 MG OR CAPS daily       Modified Medications    No medications on file     There are no discontinued medications.  ?  Allergies:  Allergies   Allergen Reactions    Pcns [Penicillins] HIVES     ?  REVIEW OF SYSTEMS   ?  Review of Systems   Constitutional:  Positive for malaise/fatigue. Negative for chills, fever and weight loss.   HENT:  Positive for tinnitus (crackling). Negative for congestion and nosebleeds.    Eyes:  Negative for pain and redness.   Respiratory:  Negative for cough and shortness of breath.    Cardiovascular:  Positive for leg swelling. Negative for chest pain and palpitations.   Gastrointestinal:  Positive for abdominal pain (bloating) and  constipation (improved). Negative for blood in stool, diarrhea, heartburn and nausea.   Genitourinary:  Positive for frequency. Negative for dysuria, hematuria and urgency.   Musculoskeletal:  Positive for joint pain. Negative for back pain, falls, myalgias and neck pain.   Skin:  Negative for itching and rash.   Neurological:  Positive for tingling (stable). Negative for dizziness, seizures, weakness and headaches.   Endo/Heme/Allergies:  Does not bruise/bleed easily.   Psychiatric/Behavioral:  Negative for depression. The patient is not nervous/anxious and does not have insomnia.      PHYSICAL EXAM   Today's Vitals:  Temperature Blood Pressure Heart Rate Resp Rate SpO2   Temp: 97 °F (36.1 °C) BP: 136/72 Pulse: 66 Resp: 16 SpO2: 99 %   ?  Current Weight Height BMI BSA Pain   Wt Readings from Last 1 Encounters:   02/25/25 124 lb 9.6 oz (56.5 kg)    Height: 5' 7\" (170.2 cm) Body mass index is 19.52 kg/m². Body surface area is 1.65 meters squared.         Physical Exam  Vitals and nursing note reviewed.   Constitutional:       General: She is not in acute distress.     Appearance: Normal appearance. She is well-developed. She is not diaphoretic.   HENT:      Head: Normocephalic.   Eyes:      General: No scleral icterus.     Extraocular Movements: Extraocular movements intact.      Conjunctiva/sclera: Conjunctivae normal.   Neck:      Vascular: No JVD.      Trachea: No tracheal deviation.   Cardiovascular:      Rate and Rhythm: Normal rate and regular rhythm.      Heart sounds: Normal heart sounds. No murmur heard.  Pulmonary:      Effort: Pulmonary effort is normal. No respiratory distress.      Breath sounds: Normal breath sounds. No wheezing.   Musculoskeletal:         General: No swelling.      Cervical back: Neck supple.      Comments: Scattered OA changes of the hands.   No swelling, tenderness, redness or restriction of motion of the DIPs, PIPs, MCPs, wrists, elbows, ankles, or joints of the feet.  Bilateral  shoulders with full ROM, no evidence of impingement with provocative maneuvers.  Bilateral knees without medial joint line tenderness, no crepitus, no effusion.  Some lumbar spinous process tenderness. Slight scoliosis noted and high right hip compared to left.   Lymphadenopathy:      Cervical: No cervical adenopathy.   Skin:     General: Skin is warm and dry.      Findings: No erythema or rash.   Neurological:      Mental Status: She is alert and oriented to person, place, and time.      Cranial Nerves: No cranial nerve deficit.      Gait: Gait normal.   Psychiatric:         Mood and Affect: Mood normal.         Behavior: Behavior normal.       ?  Radiology review:        DATE OF SERVICE: 02.19.2025  XR KNEE, COMPLETE (4 OR MORE VIEWS), RIGHT (CPT=73564)    CLINICAL INDICATION: Right knee pain, unspecified chronicity.    COMPARISON STUDY: None available.    TECHNIQUE: AP, bilateral oblique and crosstable lateral; 4 views.    FINDINGS:  There is no evidence of fracture, dislocation, or subluxation.  No significant osseous lytic or blastic lesions are identified.    Minimal weightbearing femoral joint space narrowing is seen although chondrocalcinosis is present.  There may be mild patellofemoral joint space loss and lateral exam suggests subchondral edema and  sclerosis at the level of the patella.  Small suprapatellar joint effusion is identified.    =====  IMPRESSION:  1. No evidence of focal, acute osseous abnormality.  2. Mild osteoarthritis and small reactive joint effusion. Chondrocalcinosis suggests the possibility  of CPPD.  Exam End: 02/19/25  2:42 PM    Specimen Collected: 02/20/25  1:00 PM Last Resulted: 02/20/25  1:01 PM     DEXA BONE DENSITY W/ TBS    HISTORY: Osteoporosis screening Age-related osteoporosis with current pathological fracture, initial encounter    TECHNIQUE: A DEXA scan was performed on a [employer] system, assessing the lumbar spine and left hip. Fracture risk analysis was performed using  FRAX.    COMPARISON:  05/03/2021    FINDINGS:    FRAX not reported because:    Some T-score for Spine Total or Hip Total or Femoral Neck at or below -2.5    The lumbar spine demonstrates a bone mineral density of 0.736 g/sq cm. T score is -2.9.  This represents a change in BMD by -3.8%    The left femoral neck demonstrates a bone mineral density of 0.682 g/sq cm. T score is -1.5.      At the left hip the total bone mineral density measures 0.732 g/sq cm. T score is -1.7.  This represents a change in BMD by -1.2%    IMPRESSION:      Bone mineral density within the range of osteoporosis.    FINAL REPORT  Attending Radiologist:  Lorene Benitez MD  Date Signed Off:  06/11/2024 17:11  Exam End: 06/10/24  9:58 AM    Specimen Collected: 06/11/24  5:11 PM Last Resulted: 06/11/24  5:11 PM       DATE: 6/7/2022    BONE MINERAL DENSITY MEASUREMENTS (DEXA)    SCAN TYPE: Hologic. Follow up.    PARAMETERS: Postmenopausal woman.    RESULTS:    LUMBAR SPINE (L1-L4)        BMD: 0.750 g/cm2      T-Score: -2.7      Trabecular Bone Score: 1.306    LEFT FEMORAL NECK      BMD: 0.665 g/cm2                T-Score: -1.7    LEFT TOTAL HIP                        BMD: 0.741 g/cm2                T-Score: -1.7      COMMENTS: Findings place the patient within the WHO category of osteoporosis. Compared to the prior study, there is a 11.1% decrease in spinal density and a 14.7% decrease in density in the left hip.    FRAX DATA: Not reported because some T-scores are at or below -2.5    Hip density is evaluated using the lower of the femoral neck or total hip T-score. Calculated interval changes at the hip are compared with the like area on the previous study (if applicable).    WORLD [employer]IZATION DEFINITION OF OSTEOPOROSIS:  (Defined for postmenopausal women and men over the age of 50.)  Normal: Bone mineral density is less than 1.0 standard deviation below young normals.  Osteopenia: Bone mineral density 1.0 to 2.5 standard deviations below  young normals.  Osteoporosis: Bone mineral density greater than 2.5 standard deviations below young normals.    FINAL REPORT  Attending Radiologist:  Valentín Potts MD  Date Signed Off:  06/07/2022 10:14    DATE: 6/14/2022     EXAM: Five views, lumbar spine     HISTORY: M54.50, M53.3     COMPARISON: None at [facility]     FINDINGS/   IMPRESSION: AP, lateral, coned-down lateral, and bilateral oblique views of the lumbar spine demonstrate 5 nonrib-bearing lumbar type vertebral bodies. Mild dextroconvex curvature of the thoracolumbar spine, apex right at L2. No listhesis. Vertebral body heights are preserved throughout. Mild degenerative disc disease at L2-L3. Disc spaces are otherwise preserved. No gross pars defects on the oblique views. Bilateral posterior facet arthropathy at L5-S1. No fracture or destructive bony lesion.     FINAL REPORT   Attending Radiologist:  Feng Wallis MD   Date Signed Off:  06/14/2022 09:48     Labs:  Lab Results   Component Value Date    WBC 4.7 11/07/2023    RBC 3.91 11/07/2023    HGB 12.6 11/07/2023    HCT 35.7 11/07/2023    .0 11/07/2023    MCV 91.3 11/07/2023    MCH 32.2 11/07/2023    MCHC 35.3 11/07/2023    RDW 11.5 11/07/2023    NEPRELIM 2.58 11/07/2023    NEPERCENT 55.4 11/07/2023    LYPERCENT 28.5 11/07/2023    MOPERCENT 11.8 11/07/2023    EOPERCENT 3.2 11/07/2023    BAPERCENT 0.9 11/07/2023    NE 2.58 11/07/2023    LYMABS 1.33 11/07/2023    MOABSO 0.55 11/07/2023    EOABSO 0.15 11/07/2023    BAABSO 0.04 11/07/2023     Lab Results   Component Value Date    GLU 97 07/31/2023    BUN 9 07/31/2023    BUNCREA 20.0 09/10/2021    CREATSERUM 0.78 07/31/2023    ANIONGAP 2 07/31/2023    GFR >59 10/21/2010    CA 9.1 07/31/2023    OSMOCALC 277 07/31/2023    ALKPHO 65 07/31/2023    AST 30 07/31/2023    ALT 25 07/31/2023    BILT 0.6 07/31/2023    TP 6.4 07/31/2023    TP 6.2 (L) 07/31/2023    ALB 3.9 07/31/2023    ALB 4.36 07/31/2023    GLOBULIN 2.5 (L)  07/31/2023    AGRATIO 1.5 10/10/2013     (L) 07/31/2023    K 3.8 07/31/2023     07/31/2023    CO2 29.0 07/31/2023       Additional Labs:  02/2024  IgG 490 low  IgA/IgM normal    07/2023  CBC with WBC 4.2; hemoglobin 11.9; platelet 185  CMP grossly normal  SPEP with hypogammaglobulinemia; no monoclonal protein detected by immunofixation  Magnesium normal  PTH normal  B12 782 normal  TSH normal  High-sensitivity CRP normal  Vitamin D 50.4    07/2022  PTH intact normal  Phos normal  Mag normal  C telopeptide normal     Zaida Peters DO  [facility]  02/25/2025

## 2025-02-26 LAB — CCP IGG SERPL-ACNC: 0.6 U/ML (ref 0–6.9)

## 2025-02-27 LAB — NUCLEAR IGG TITR SER IF: NEGATIVE {TITER}

## 2025-03-20 ENCOUNTER — PROCEDURE VISIT (OUTPATIENT)
Dept: NEUROLOGY | Facility: CLINIC | Age: 70
End: 2025-03-20
Payer: MEDICARE

## 2025-03-20 DIAGNOSIS — G62.9 PERIPHERAL POLYNEUROPATHY: Primary | ICD-10-CM

## 2025-03-20 DIAGNOSIS — G62.9 SMALL FIBER NEUROPATHY: ICD-10-CM

## 2025-03-20 PROCEDURE — 95886 MUSC TEST DONE W/N TEST COMP: CPT | Performed by: OTHER

## 2025-03-20 PROCEDURE — 95910 NRV CNDJ TEST 7-8 STUDIES: CPT | Performed by: OTHER

## 2025-03-20 NOTE — PROGRESS NOTES
[facility]  [address], [place] [address]   [phone]        Full Name: CHELO GAMING Gender: Female  Patient ID: [id number] YOB: 1955      Visit Date: 3/20/2025 8:51 AM  Age: 69 Years  Examining Physician: FRANCESCA SUNG MD  Height: 5 feet 7 inch  Weight: 124 lbs  History: B/L LEGS; history of longstanding paresthesias in the toes both bottom and top and sensations of pins-and-needles in the bottom of the feet, which she feels as though she is walking and very hard stones.      Sensory NCS      Nerve / Sites Rec. Site Onset Lat Peak Lat NP Amp PP Amp Segments Distance Peak Diff Velocity Comment     ms ms µV µV  cm ms m/s    R Sural - (Antidromic)      Calf Ankle NR NR NR NR Calf - Ankle 14  NR    R Superficial peroneal - (Antidromic)      Lat leg Ankle NR NR NR NR Lat leg - Ankle 14  NR    L Medial plantar, Lateral plantar - (Orthodromic)      Medial plantar Sole Ankle NR NR NR NR Medial plantar Sole - Ankle 14  NR       Lateral plantar Sole Ankle NR NR NR NR Lateral plantar Sole - Ankle 14  NR          Medial plantar Sole - Lateral plantar Sole  NR         Motor NCS      Nerve / Sites Muscle Latency Amplitude Segments Dist. Lat Diff Velocity Comments     ms mV  cm ms m/s    R Peroneal - EDB      Ankle EDB 4.23 3.5 Ankle - EDB 8         B. Fib Head EDB 11.67 3.5 B. Fib Head - Ankle 33.5 7.44 45.0       A. Fib Head EDB 13.73 3.3 A. Fib Head - B. Fib Head 10 2.06 48.5    L Peroneal - EDB      Ankle EDB 3.81 4.5 Ankle - EDB 8         B. Fib Head EDB 11.73 3.4 B. Fib Head - Ankle 32 7.92 40.4    R Tibial - AH      Ankle AH 5.08 5.3 Ankle - AH 8         Knee AH 14.23 4.2 Knee - Ankle 40 9.15 43.7    L Tibial - AH      Ankle AH 4.90 4.5 Ankle - AH 8         Knee AH 13.56 2.9 Knee - Ankle 40 8.67 46.2        F  Wave      Nerve M Latency F Latency F-M Lat    ms ms ms   R Tibial - AH 7.1 68.3 61.2   L Tibial - AH 5.4 65.4 60.0       EMG Summary Table      Spontaneous MUAP Recruitment   Muscle IA Fib PSW Fasc H.F. Amp Dur. PPP Pattern   R. Gastrocnemius N None None None None N N N N   R. Tibialis anterior N None None None None N N N N   R. Extensor hallucis longus N None None None None N N N N   R. Peroneus longus N None None None None N N N N   L. Tibialis anterior N None None None None N N N N   L. Extensor hallucis longus N None None None None N N N N   L. Peroneus longus N None None None None N N N N   L. Tibialis posterior N None None None None N N N N   L. Gastrocnemius N None None None None N N N N         CHELO GAMING [id number] 3/20/2025 8:51 AM     3 of 3  RESULTS:  Sensory nerve conduction study shows absent right sural, right superficial peroneal and plantar cutaneous responses on the left.  Motor conduction study showed normal bilateral peroneal and bilateral tibial latencies amplitudes and velocities.  F waves of both tibial nerve however were delayed.  Electromyography of the lower extremities failed to demonstrate any acute denervation potentials, no myopathic changes or any other chronic motor units firing seen.    Conclusion:   This is a 69-year-old patient who had been complaining of paresthesias in the toes and also uncomfortable feeling when barefoot of sharp pins-and-needles in the bottom of the feet.  This has been ongoing for years.  Electrophysiologic studies demonstrates the absence of sensory responses in the lower extremities.  The prolonged F waves significance are uncertain but would be compatible with the presence of distal predominantly sensory neuropathy.  Clinically patient's symptoms are also compatible with small fiber type of neuropathy.        FRANCESCA GAMING [id number] 3/20/2025 8:51 AM     3 of 3

## 2025-03-20 NOTE — PROGRESS NOTES
Get LABS for small fiber neuropathy    ACE, B1, B6, Copper, Serum and urine Monoclonal Protein Analysis, nerve biopsy, fat pad analysis (amyloidosis)  Paraneoplastic Panel  Ganglionic Acetylcholine Receptor Antibodies for autoimmune autonomic Ganglionopathy    She is not interested in oral meds to control symptoms  Referred to [facility] for their PN program      Howard Jackson MD  Vascular & General Neurology  Multiple Sclerosis & related disorders  [facility]  3/20/2025, Time completed 10:00 AM

## 2025-04-02 ENCOUNTER — LAB ENCOUNTER (OUTPATIENT)
Dept: LAB | Age: 70
End: 2025-04-02
Attending: Other
Payer: MEDICARE

## 2025-04-02 DIAGNOSIS — G62.9 PERIPHERAL POLYNEUROPATHY: Primary | ICD-10-CM

## 2025-04-02 DIAGNOSIS — G62.9 SMALL FIBER NEUROPATHY: ICD-10-CM

## 2025-04-02 PROCEDURE — 82164 ANGIOTENSIN I ENZYME TEST: CPT

## 2025-04-02 PROCEDURE — 86235 NUCLEAR ANTIGEN ANTIBODY: CPT

## 2025-04-02 PROCEDURE — 36415 COLL VENOUS BLD VENIPUNCTURE: CPT

## 2025-04-02 PROCEDURE — 83519 RIA NONANTIBODY: CPT

## 2025-04-02 PROCEDURE — 84207 ASSAY OF VITAMIN B-6: CPT

## 2025-04-02 PROCEDURE — 84425 ASSAY OF VITAMIN B-1: CPT

## 2025-04-02 PROCEDURE — 86255 FLUORESCENT ANTIBODY SCREEN: CPT

## 2025-04-03 LAB — ACE: 43 U/L

## 2025-04-04 LAB
ANTI-HU AB: NEGATIVE
ANTI-RI AB: NEGATIVE
ANTI-YO AB: NEGATIVE
ENA SS-A IGG SER IA-ACNC: <0.4 U/ML
ENA SS-B IGG SER IA-ACNC: <0.4 U/ML

## 2025-04-06 LAB — VITAMIN B1 WHOLE BLD: 73.9 NMOL/L

## 2025-04-09 LAB — VITAMIN B6: 84.3 UG/L

## 2025-05-19 ENCOUNTER — PATIENT MESSAGE (OUTPATIENT)
Dept: NEUROLOGY | Facility: CLINIC | Age: 70
End: 2025-05-19

## 2025-05-19 DIAGNOSIS — G62.9 PERIPHERAL POLYNEUROPATHY: Primary | ICD-10-CM

## 2025-05-19 DIAGNOSIS — G62.9 SMALL FIBER NEUROPATHY: ICD-10-CM

## 2025-05-22 LAB
GANGLIONIC ACHRAB, SERUM: <0.067 NMOL/L
GANGLIONIC ACHRAB, SERUM: <0.067 NMOL/L

## 2025-05-22 NOTE — TELEPHONE ENCOUNTER
Per Dr. Jackson's note, patient to repeat Vitamin B6 level in 3 months.  Lab order placed and patient notified via [employer].

## 2025-05-22 NOTE — TELEPHONE ENCOUNTER
Patient call back, the next appointment available is in December.    She has a question is Dr. Jackson going to repeat  the b-6 blood test? If so can we mailed it out the order to her home? Thanks

## 2025-05-22 NOTE — TELEPHONE ENCOUNTER
I have called and left a message that I just now received a LABORATORY report that the previously report Ganglionic Acetylcholine receptor antibodies abnormality is actually an error and is a normal result othetwise      Dr. FRANCESCA Jackson

## (undated) NOTE — Clinical Note
Please fax copy of note to PCP.  Jeanine Hill can you show Navneet Chin how to do it tomorrow maybe)  Darien Bal, DO [facility] 7/27/2023